# Patient Record
Sex: FEMALE | Race: OTHER
[De-identification: names, ages, dates, MRNs, and addresses within clinical notes are randomized per-mention and may not be internally consistent; named-entity substitution may affect disease eponyms.]

---

## 2018-06-25 ENCOUNTER — HOSPITAL ENCOUNTER (OUTPATIENT)
Dept: HOSPITAL 61 - US | Age: 38
Discharge: HOME | End: 2018-06-25
Attending: FAMILY MEDICINE
Payer: COMMERCIAL

## 2018-06-25 DIAGNOSIS — Z3A.21: ICD-10-CM

## 2018-06-25 DIAGNOSIS — Z34.82: Primary | ICD-10-CM

## 2018-06-25 PROCEDURE — 76805 OB US >/= 14 WKS SNGL FETUS: CPT

## 2018-09-24 ENCOUNTER — HOSPITAL ENCOUNTER (OUTPATIENT)
Dept: HOSPITAL 61 - 3 SO LND | Age: 38
Setting detail: OBSERVATION
Discharge: HOME | End: 2018-09-24
Attending: FAMILY MEDICINE | Admitting: FAMILY MEDICINE
Payer: COMMERCIAL

## 2018-09-24 ENCOUNTER — HOSPITAL ENCOUNTER (OUTPATIENT)
Dept: HOSPITAL 61 - US | Age: 38
Discharge: HOME | End: 2018-09-24
Attending: FAMILY MEDICINE
Payer: COMMERCIAL

## 2018-09-24 DIAGNOSIS — Z3A.38: ICD-10-CM

## 2018-09-24 DIAGNOSIS — O24.419: Primary | ICD-10-CM

## 2018-09-24 DIAGNOSIS — Z3A.33: ICD-10-CM

## 2018-09-24 PROCEDURE — G0379 DIRECT REFER HOSPITAL OBSERV: HCPCS

## 2018-09-24 PROCEDURE — 59025 FETAL NON-STRESS TEST: CPT

## 2018-09-24 PROCEDURE — G0378 HOSPITAL OBSERVATION PER HR: HCPCS

## 2018-09-24 PROCEDURE — 76819 FETAL BIOPHYS PROFIL W/O NST: CPT

## 2018-09-24 NOTE — RAD
Ultrasound biophysical profile, 9/24/2018:

 

HISTORY: Gestational diabetes

 

The limited exam of the gravid uterus demonstrates a single fetus in a 

cephalic orientation. The fetal heart rate is 139 bpm. The placenta lies 

anteriorly. A normal amount of amniotic fluid is present with the MARK 

calculated at 11.8. The fetal weight was estimated at 5 pounds and 0 

ounces +/- 12 ounces. The EDC based on today's fetal measurements is 

11/7/2018, and correlates well with the EDC of 11/4/2018 established on 

the 6/25/2018 ultrasound exam. The following biophysical profile scores 

were obtained:

 

Fetal breathing movements-2

 

Fetal motion-2

 

Fetal tone-2

 

Amniotic fluid volume-2

 

Total score-8 out of 8:

 

IMPRESSION: The ultrasound component of the biophysical profile score is 8

out of 8.

 

Electronically signed by: Rick Moritz, MD (9/24/2018 4:53 PM) [facility]

## 2018-10-08 ENCOUNTER — HOSPITAL ENCOUNTER (OUTPATIENT)
Dept: HOSPITAL 61 - US | Age: 38
Setting detail: OBSERVATION
Discharge: HOME | End: 2018-10-08
Attending: FAMILY MEDICINE | Admitting: FAMILY MEDICINE
Payer: COMMERCIAL

## 2018-10-08 DIAGNOSIS — O24.419: Primary | ICD-10-CM

## 2018-10-08 DIAGNOSIS — Z3A.35: ICD-10-CM

## 2018-10-08 PROCEDURE — 59025 FETAL NON-STRESS TEST: CPT

## 2018-10-08 PROCEDURE — G0379 DIRECT REFER HOSPITAL OBSERV: HCPCS

## 2018-10-08 PROCEDURE — G0378 HOSPITAL OBSERVATION PER HR: HCPCS

## 2018-10-08 PROCEDURE — 76819 FETAL BIOPHYS PROFIL W/O NST: CPT

## 2018-10-08 NOTE — RAD
Ultrasound biophysical profile, 10/8/2018:

 

HISTORY: Gestational diabetes

 

The limited exam of the gravid uterus demonstrates a single fetus in a 

cephalic orientation. The fetal heart rate is 121 bpm. The placenta lies 

anteriorly. The amniotic fluid index is 11.5 which is within normal 

limits. The fetal weight was estimated at 6 pounds and 5 ounces +/- 15.

 

The following biophysical profile scores were obtained:

 

Fetal breathing movements-2

 

Fetal motion-2

 

Fetal tone-2

 

Amniotic fluid volume-2

 

Total score-8 out of 8

 

 

IMPRESSION: The ultrasound component of the biophysical profile score is 8

out of 8.

 

Electronically signed by: Rick Moritz, MD (10/8/2018 5:04 PM) [facility]

## 2018-10-22 ENCOUNTER — HOSPITAL ENCOUNTER (OUTPATIENT)
Dept: HOSPITAL 61 - 3 SO LND | Age: 38
Setting detail: OBSERVATION
Discharge: HOME | End: 2018-10-22
Attending: FAMILY MEDICINE | Admitting: FAMILY MEDICINE
Payer: COMMERCIAL

## 2018-10-22 DIAGNOSIS — O24.419: Primary | ICD-10-CM

## 2018-10-22 DIAGNOSIS — Z3A.37: ICD-10-CM

## 2018-10-22 PROCEDURE — 76819 FETAL BIOPHYS PROFIL W/O NST: CPT

## 2018-10-22 PROCEDURE — G0379 DIRECT REFER HOSPITAL OBSERV: HCPCS

## 2018-10-22 PROCEDURE — G0378 HOSPITAL OBSERVATION PER HR: HCPCS

## 2018-10-22 NOTE — RAD
Ultrasound biophysical profile, 10/22/2018:

 

HISTORY: Gestational diabetes

 

The limited exam of the gravid uterus demonstrates a single fetus in a 

cephalic orientation. The fetal heart rate is 123 bpm. The fetal weight 

was estimated at 7 pounds and 9 ounces +/- 18 ounces. The placenta lies 

anteriorly. Amniotic fluid index was calculated at 11.0. The following 

biophysical profile scores were obtained:

 

Fetal breathing movements-2

 

Fetal motion-2

 

Fetal tone-2

 

Amniotic fluid volume-2

 

Total score-8 out of 8:

 

IMPRESSION: The ultrasound component of the biophysical profile score is 8

out of 8.

 

Electronically signed by: Rick Moritz, MD (10/22/2018 12:48 PM) [facility]

## 2018-10-29 ENCOUNTER — HOSPITAL ENCOUNTER (OUTPATIENT)
Dept: HOSPITAL 61 - 3 SO LND | Age: 38
Setting detail: OBSERVATION
Discharge: HOME | End: 2018-10-29
Attending: FAMILY MEDICINE | Admitting: FAMILY MEDICINE
Payer: COMMERCIAL

## 2018-10-29 DIAGNOSIS — Z3A.38: ICD-10-CM

## 2018-10-29 DIAGNOSIS — O24.410: Primary | ICD-10-CM

## 2018-10-29 PROCEDURE — G0378 HOSPITAL OBSERVATION PER HR: HCPCS

## 2018-10-29 PROCEDURE — G0379 DIRECT REFER HOSPITAL OBSERV: HCPCS

## 2018-10-29 PROCEDURE — 76819 FETAL BIOPHYS PROFIL W/O NST: CPT

## 2018-10-29 NOTE — RAD
Ultrasound biophysical profile

 

History:  Gestational diabetes.  Weekly biophysical profile.

 

Comparison:  Same examination October 22, 2018.

 

Findings:

 

Ultrasound biophysical profile was performed. Score is as follows:

 

Fetal motion, 2 of 2.

 

Fetal breathing, 2 of 2.

 

Fetal tone, 2 of 2.

 

Qualitative amniotic fluid volume, 2 of 2.

 

Total score is 8 of 8.

 

Fetal presentation is cephalic.  Placenta is anterior.  Fetal heart rate 

is 133 bpm.  Amniotic fluid index is 10.3 cm.

 

BPD is 9.41 cm corresponding to 38 weeks 2 days.  HC is 33.5 cm, 

corresponding to 38 weeks 6 days.  AC is 34.74 cm, corresponding to 39 

weeks 5 days.  FL is 7.75 cm, corresponding to 39 weeks 4 days.  HC/AC 

ratio is 0.95, within normal limits.  Average ultrasound age is 39 weeks 1

day.  Estimated date of delivery based on current measurements is November 4, 2018.  Estimated fetal weight is 3768 +/- 558 g which is at 84 

percentile.  Clinical gestational age is 38 weeks 0 days.

 

Impression:

Biophysical profile score is 8 of 8.

 

Electronically signed by: Kyle Martinez MD (10/29/2018 4:15 PM) [facility]

## 2018-11-05 ENCOUNTER — HOSPITAL ENCOUNTER (INPATIENT)
Dept: HOSPITAL 61 - 3 SO LND | Age: 38
LOS: 3 days | Discharge: HOME | End: 2018-11-08
Attending: FAMILY MEDICINE | Admitting: FAMILY MEDICINE
Payer: COMMERCIAL

## 2018-11-05 ENCOUNTER — HOSPITAL ENCOUNTER (OUTPATIENT)
Dept: HOSPITAL 61 - 3 SO LND | Age: 38
Setting detail: OBSERVATION
Discharge: HOME | End: 2018-11-05
Attending: FAMILY MEDICINE | Admitting: FAMILY MEDICINE
Payer: COMMERCIAL

## 2018-11-05 VITALS — BODY MASS INDEX: 42.26 KG/M2 | WEIGHT: 215.25 LBS | HEIGHT: 60 IN

## 2018-11-05 VITALS — DIASTOLIC BLOOD PRESSURE: 83 MMHG | SYSTOLIC BLOOD PRESSURE: 150 MMHG

## 2018-11-05 DIAGNOSIS — Z3A.39: ICD-10-CM

## 2018-11-05 DIAGNOSIS — D64.9: ICD-10-CM

## 2018-11-05 DIAGNOSIS — Z37.9: ICD-10-CM

## 2018-11-05 LAB
BASOPHILS # BLD AUTO: 0.1 X10^3/UL (ref 0–0.2)
BASOPHILS NFR BLD: 1 % (ref 0–3)
EOSINOPHIL NFR BLD: 0.1 X10^3/UL (ref 0–0.7)
EOSINOPHIL NFR BLD: 1 % (ref 0–3)
ERYTHROCYTE [DISTWIDTH] IN BLOOD BY AUTOMATED COUNT: 14.8 % (ref 11.5–14.5)
HCT VFR BLD CALC: 35.6 % (ref 36–47)
HGB BLD-MCNC: 12.4 G/DL (ref 12–15.5)
LYMPHOCYTES # BLD: 1.8 X10^3/UL (ref 1–4.8)
LYMPHOCYTES NFR BLD AUTO: 21 % (ref 24–48)
MCH RBC QN AUTO: 30 PG (ref 25–35)
MCHC RBC AUTO-ENTMCNC: 35 G/DL (ref 31–37)
MCV RBC AUTO: 85 FL (ref 79–100)
MONO #: 0.5 X10^3/UL (ref 0–1.1)
MONOCYTES NFR BLD: 6 % (ref 0–9)
NEUT #: 6.2 X10^3UL (ref 1.8–7.7)
NEUTROPHILS NFR BLD AUTO: 71 % (ref 31–73)
PLATELET # BLD AUTO: 211 X10^3/UL (ref 140–400)
RBC # BLD AUTO: 4.2 X10^6/UL (ref 3.5–5.4)
WBC # BLD AUTO: 8.7 X10^3/UL (ref 4–11)

## 2018-11-05 PROCEDURE — 86592 SYPHILIS TEST NON-TREP QUAL: CPT

## 2018-11-05 PROCEDURE — 85025 COMPLETE CBC W/AUTO DIFF WBC: CPT

## 2018-11-05 PROCEDURE — 3E0P7VZ INTRODUCTION OF HORMONE INTO FEMALE REPRODUCTIVE, VIA NATURAL OR ARTIFICIAL OPENING: ICD-10-PCS | Performed by: FAMILY MEDICINE

## 2018-11-05 PROCEDURE — 36415 COLL VENOUS BLD VENIPUNCTURE: CPT

## 2018-11-05 PROCEDURE — 86901 BLOOD TYPING SEROLOGIC RH(D): CPT

## 2018-11-05 PROCEDURE — G0379 DIRECT REFER HOSPITAL OBSERV: HCPCS

## 2018-11-05 PROCEDURE — 0DQR0ZZ REPAIR ANAL SPHINCTER, OPEN APPROACH: ICD-10-PCS | Performed by: FAMILY MEDICINE

## 2018-11-05 PROCEDURE — 86900 BLOOD TYPING SEROLOGIC ABO: CPT

## 2018-11-05 PROCEDURE — 90715 TDAP VACCINE 7 YRS/> IM: CPT

## 2018-11-05 PROCEDURE — 85027 COMPLETE CBC AUTOMATED: CPT

## 2018-11-05 PROCEDURE — 76819 FETAL BIOPHYS PROFIL W/O NST: CPT

## 2018-11-05 PROCEDURE — 86850 RBC ANTIBODY SCREEN: CPT

## 2018-11-05 PROCEDURE — 59025 FETAL NON-STRESS TEST: CPT

## 2018-11-05 PROCEDURE — G0378 HOSPITAL OBSERVATION PER HR: HCPCS

## 2018-11-05 NOTE — RAD
Ultrasound biophysical profile exam, 11/5/2018:

 

HISTORY: Gestational diabetes

 

The limited exam of the gravid uterus demonstrates a single fetus in a 

cephalic orientation. The fetal heart rate is 135 bpm. The fetal weight 

was estimated at 8 pounds and 8 ounces +/- 20 ounces. The placenta lies 

anteriorly. The following biophysical profile scores were obtained:

 

Fetal breathing movements-2

 

Fetal motion-2

 

Fetal tone-2

 

Amniotic fluid volume-2

 

Total score-8 out of 8

 

IMPRESSION: The ultrasound component of the biophysical profile score is 8

out of 8.

 

Electronically signed by: Rick Moritz, MD (11/5/2018 4:55 PM) [facility]

## 2018-11-06 VITALS — SYSTOLIC BLOOD PRESSURE: 122 MMHG | DIASTOLIC BLOOD PRESSURE: 68 MMHG

## 2018-11-06 VITALS — SYSTOLIC BLOOD PRESSURE: 128 MMHG | DIASTOLIC BLOOD PRESSURE: 83 MMHG

## 2018-11-06 RX ADMIN — IBUPROFEN PRN MG: 400 TABLET ORAL at 16:22

## 2018-11-06 NOTE — PDOC1
OB - History


Hx of Present Pregnancy


Prenatal Care:  Good Care


Ultrasounds:  Normal mid trimester US


Obstetrical Complications:  Gestational Diabetes


Medical Complications:  None





Past Family/Social History


*


Past Medical, Surgical, Family and Obstetric Histories reviewed from prenatal 

chart.


Blood Type:  B+


Rubella:  Immune


RPR/VDRL:  Negative


GBS Status:  Negative


HBsAG:  Negative





OB - Chief Complaint & HPI


Date of Admission:


Date of Admission:  2018 at 20:17


Chief Complaint/History


:  3


Para:  2


EDC:  2018


EGA:  39.1


Reason for admission:  induction of labor


Indication for induction:  medical complication


Admission Nurse Assessment Rev:  No





OB - Admission Exam


Physical Exam


Vitals:





 VS - Last 72 Hours, by Label








  Date Time  Temp Pulse Resp B/P (MAP) Pulse Ox O2 Delivery O2 Flow Rate FiO2


 


18 20:44 98.1 62 18 150/83 (105)  Room Air  





 98.1       








HEENT:  Normal, Nasal Mucosa Normal, Oropharynx Normal, Moist Membranes, 

Fontanelles Normal


Heart:  Regular Rate, No Murmurs, No Gallops, No Rubs


Lungs:  Clear, Equal


Abdomen:  Gravid


Extremities:  Normal Pulses, No tenderness or swelling


Reflexes:  Normal


Cervical Dilatation:  None


Effacement:  50%


Station:  -3


Membranes:  Intact


Accelerations:  Accelerations Present


Decelerations:  No decelerations


Short Term Variability:  Present


Long Term Variability:  Moderate


Contractions on Admission:  None


Date/Time Contractions Began;:  18


Frequency of Contractions:  0500


A/P


Pt is a 37yo  admitted for IOL 2/2 GDM





1)IOL- s/p Cervadil last night.  Pt went into labor early this morning.  CEFM


2)Pain mgmt- pt not interested in pain medication at this time


3)GBS negative


4)Pt is planning on breastfeeding


5)GDM- BS diet controlled











MALGORZATA IZAGUIRRE MD 2018 07:02

## 2018-11-06 NOTE — PDOC
VAGINAL DELIVERY


DATE


DATE: 18


TIME


0717


:  3


Para:  3


EDC:  2018


EGA:  39.1


VAGINAL DELIVERY:  VTX


VACCUM ASSISTED:  No


PLACENTA:  Spontaneous


APGAR


8 and 9


SEX:  Male


WEIGHT


Weight 3505g or 7 pound 12oz


Nuchal Cord:  Yes (body cord), Times 1, Tight


Amniotic Fluid:  Clear


PAIN:  Natural


EPISIOTOMY:  No


EXTENSION:  Yes (3rd degree perianal)


REPAIRED WITH


3'0" vicryl


EBL


350cc


COMPLICATIONS


None


CONDITION


Stable


PEDIATRICIAN


Dr. Izaguirre


Signs of Intrauterine Infectio:  None


Shoulder Dystocia:  No


DIAGNOSIS


Pt is a 37yo G3 now P3 s/p induced vaginal delivery at 39.1wga 2/2 GDM





1)- with 3rd degree perianal repair


2)Pain mgmt- will have ibuprofen available for pain


3)GBS negative


4)Pt is planning on breastfeeding


5)GDM- BS diet controlled











MALGORZATA IZAGUIRRE MD 2018 07:42

## 2018-11-07 VITALS — DIASTOLIC BLOOD PRESSURE: 70 MMHG | SYSTOLIC BLOOD PRESSURE: 122 MMHG

## 2018-11-07 VITALS — SYSTOLIC BLOOD PRESSURE: 107 MMHG | DIASTOLIC BLOOD PRESSURE: 57 MMHG

## 2018-11-07 VITALS — SYSTOLIC BLOOD PRESSURE: 126 MMHG | DIASTOLIC BLOOD PRESSURE: 66 MMHG

## 2018-11-07 VITALS — SYSTOLIC BLOOD PRESSURE: 123 MMHG | DIASTOLIC BLOOD PRESSURE: 72 MMHG

## 2018-11-07 VITALS — DIASTOLIC BLOOD PRESSURE: 68 MMHG | SYSTOLIC BLOOD PRESSURE: 126 MMHG

## 2018-11-07 VITALS — DIASTOLIC BLOOD PRESSURE: 53 MMHG | SYSTOLIC BLOOD PRESSURE: 111 MMHG

## 2018-11-07 LAB
ERYTHROCYTE [DISTWIDTH] IN BLOOD BY AUTOMATED COUNT: 15.2 % (ref 11.5–14.5)
HCT VFR BLD CALC: 31.9 % (ref 36–47)
HGB BLD-MCNC: 10.9 G/DL (ref 12–15.5)
MCH RBC QN AUTO: 29 PG (ref 25–35)
MCHC RBC AUTO-ENTMCNC: 34 G/DL (ref 31–37)
MCV RBC AUTO: 85 FL (ref 79–100)
PLATELET # BLD AUTO: 178 X10^3/UL (ref 140–400)
RBC # BLD AUTO: 3.76 X10^6/UL (ref 3.5–5.4)
WBC # BLD AUTO: 10.4 X10^3/UL (ref 4–11)

## 2018-11-07 NOTE — PDOC
OB Progress Note


Date of Service


18


Time of Evaluation


0800


Date:


18


Time:


07


Notes


Mom doing well.  Breastfeeding is going well.  Pain is well controlled.  No BM 

yet.  Lochia is about the same as a period


OB VITAL SIGNS:  Temperature (97.9), Blood Pressure (123/72), Pulse (65), O2 

Sat (97% RA)


Lab





Laboratory Tests








Test


 18


20:55 18


04:15


 


White Blood Count


 8.7 x10^3/uL


(4.0-11.0) 10.4 x10^3/uL


(4.0-11.0)


 


Red Blood Count


 4.20 x10^6/uL


(3.50-5.40) 3.76 x10^6/uL


(3.50-5.40)


 


Hemoglobin


 12.4 g/dL


(12.0-15.5) 10.9 g/dL


(12.0-15.5)


 


Hematocrit


 35.6 %


(36.0-47.0) 31.9 %


(36.0-47.0)


 


Mean Corpuscular Volume 85 fL ()  85 fL () 


 


Mean Corpuscular Hemoglobin 30 pg (25-35)  29 pg (25-35) 


 


Mean Corpuscular Hemoglobin


Concent 35 g/dL


(31-37) 34 g/dL


(31-37)


 


Red Cell Distribution Width


 14.8 %


(11.5-14.5) 15.2 %


(11.5-14.5)


 


Platelet Count


 211 x10^3/uL


(140-400) 178 x10^3/uL


(140-400)


 


Neutrophils (%) (Auto) 71 % (31-73)  


 


Lymphocytes (%) (Auto) 21 % (24-48)  


 


Monocytes (%) (Auto) 6 % (0-9)  


 


Eosinophils (%) (Auto) 1 % (0-3)  


 


Basophils (%) (Auto) 1 % (0-3)  


 


Neutrophils # (Auto)


 6.2 x10^3uL


(1.8-7.7) 





 


Lymphocytes # (Auto)


 1.8 x10^3/uL


(1.0-4.8) 





 


Monocytes # (Auto)


 0.5 x10^3/uL


(0.0-1.1) 





 


Eosinophils # (Auto)


 0.1 x10^3/uL


(0.0-0.7) 





 


Basophils # (Auto)


 0.1 x10^3/uL


(0.0-0.2) 





 


Treponema pallidum Antibody


 Nonreactive


(Nonreactive) 











Laboratory Tests








Test


 18


04:15


 


White Blood Count


 10.4 x10^3/uL


(4.0-11.0)


 


Red Blood Count


 3.76 x10^6/uL


(3.50-5.40)


 


Hemoglobin


 10.9 g/dL


(12.0-15.5)


 


Hematocrit


 31.9 %


(36.0-47.0)


 


Mean Corpuscular Volume 85 fL () 


 


Mean Corpuscular Hemoglobin 29 pg (25-35) 


 


Mean Corpuscular Hemoglobin


Concent 34 g/dL


(31-37)


 


Red Cell Distribution Width


 15.2 %


(11.5-14.5)


 


Platelet Count


 178 x10^3/uL


(140-400)








Medications





Current Medications


Sodium Chloride (Normal Saline Flush) 3 ml QSHIFT  PRN IV AFTER MEDS AND BLOOD 

DRAWS;  Start 18 at 20:30;  Stop 18 at 11:08;  Status DC


Ringer's Solution 1,000 ml @  125 mls/hr Q8H IV  Last administered on 18at 

21:02;  Start 18 at 20:18;  Stop 18 at 11:08;  Status DC


Butorphanol Tartrate (Stadol) 2 mg PRN Q1HR  PRN IV Severe labor pain;  Start  at 20:30;  Stop 18 at 11:08;  Status DC


Fentanyl Citrate (Fentanyl 2ml Vial) 50 mcg PRN Q30MIN  PRN IV Mild to moderate 

pain;  Start 18 at 20:30;  Stop 18 at 11:08;  Status DC


Fentanyl Citrate (Fentanyl 2ml Vial) 100 mcg PRN Q30MIN  PRN IV Severe pain;  

Start 18 at 20:30;  Stop 18 at 11:08;  Status DC


Acetaminophen (Tylenol) 650 mg PRN Q6HRS  PRN PO MILD PAIN / TEMP;  Start  at 20:30


Ondansetron HCl (Zofran) 4 mg PRN Q4HRS  PRN IV NAUSEA/VOMITING;  Start 18 

at 20:30;  Stop 18 at 11:08;  Status DC


Citric Acid/ Sodium Citrate (Bicitra) 30 ml 1X PRN  PRN PO DYSPEPSIA;  Start 18 at 20:30;  Stop 18 at 11:08;  Status DC


Terbutaline Sulfate (Brethine) 0.25 mg 1X PRN  PRN SQ SEE COMMENTS;  Start  at 20:30;  Stop 18 at 11:08;  Status DC


Lidocaine HCl (Xylocaine 1% Pf 30ml Vial) 30 ml 1X PRN  PRN INJ SEE COMMENTS 

Last administered on 18at 07:55;  Start 18 at 20:30;  Stop 18 at 

11:08;  Status DC


Oxytocin/Sodium Chloride 500 ml @ 0 mls/hr CONT  PRN IV SEE I/O RECORD;  Start 

18 at 08:00;  Stop 18 at 11:08;  Status DC


Oxytocin/Sodium Chloride 500 ml @ 0 mls/hr CONT PRN  PRN IV Post delivery 

bleeding Last administered on 18at 07:54;  Start 18 at 20:30;  Stop  at 11:08;  Status DC


Ibuprofen (Motrin) 800 mg PRN Q6HRS  PRN PO MODERATE POST DELIVERY PAIN Last 

administered on 18at 16:22;  Start 18 at 20:30


Dinoprostone (Cervidil) 10 mg 1X  ONCE VG  Last administered on 18at 21:29

;  Start 18 at 21:00;  Stop 18 at 11:08;  Status DC


Sodium Chloride (Normal Saline Flush) 10 ml QSHIFT  PRN IV AFTER MEDS AND BLOOD 

DRAWS;  Start 18 at 07:45;  Stop 18 at 11:08;  Status DC


Oxytocin/Sodium Chloride 500 ml @  62.5 mls/hr CONT  PRN IV SEE I/O RECORD;  

Start 18 at 07:45;  Stop 18 at 11:08;  Status DC


Acetaminophen (Tylenol) 650 mg PRN Q6HRS  PRN PO MILD PAIN / TEMP;  Start  at 07:45;  Stop 18 at 11:08;  Status DC


Ibuprofen (Motrin) 800 mg PRN Q8HRS  PRN PO INFLAMMATION/PAIN PREVENTION Last 

administered on 18at 09:51;  Start 18 at 07:45;  Stop 18 at 11:08

;  Status DC


Docusate Sodium (Colace) 100 mg PRN BID  PRN PO CONSTIPATION;  Start 18 at 

07:45


Magnesium Hydroxide (Milk Of Magnesia) 2,400 mg PRN DAILY  PRN PO CONSTIPATION;

  Start 18 at 07:45


Al Hydroxide/Mg Hydroxide (Mylanta Plus Xs) 30 ml PRN Q4HRS  PRN PO HEARTBURN / 

GAS;  Start 18 at 07:45


Simethicone (Gas-X) 80 mg PRN AFTMEALHC  PRN PO GAS / BLOATING;  Start 18 

at 07:45


Diphenhydramine HCl (Benadryl) 25 mg PRN Q6HRS  PRN PO ITCHING;  Start 18 

at 07:45


Benzocaine (Americaine) 1 spray PRN QID  PRN TP TOPICAL PAIN Last administered 

on 18at 09:50;  Start 18 at 07:45


Phenyleph/Shark Oil/Min Oil/Petrol (Preparation H) 1 karuna PRN QID  PRN RC RECTAL 

PAIN;  Start 18 at 07:45


Hydrocortisone (Cortaid) 1 karuna PRN QID  PRN TP PERINEAL PAIN;  Start 18 at 

07:45


Ferrous Sulfate (Feosol) 325 mg BIDWMEALS PO ;  Start 18 at 08:00


Zolpidem Tartrate (Ambien) 5 mg PRN QHS  PRN PO INSOMNIA, MAY REPEAT X1;  Start 

18 at 07:45


Info (Do NOT chart on this placeholder) 1 ea 1X PRN  PRN MC SEE COMMENTS;  

Start 18 at 07:45


Info (Do NOT chart on this placeholder) 1 ea 1X PRN  PRN MC SEE COMMENTS;  

Start 18 at 07:45;  Stop 18 at 11:08;  Status DC


Diphtheria/ Tetanus/Acell Pertussis (Boostrix) 0.5 ml ONCE ONCE VAX IM ;  Start 

18 at 17:15;  Stop 18 at 17:16;  Status DC


Exam


GEN: NAD, AOx3


HEENT: MMM, EOMI, no scleral icterus/injection


Cardiac: RRR, no M/R/G


Lungs: CTAB, regular breathing rate and effort


Abd: fundal height approximately umbilicus


Ext: no erythema/edema LE bilaterally


Assessment


Pt is a 39yo G3 now P3 s/p induced vaginal delivery at 39.1wga 2/2 GDM





1)- with 3rd degree perianal repair


2)Pain mgmt- continue Ibuprofen


3)GBS negative


4)Pt is breastfeeding


5)GDM- BS diet controlled


6)Anemia- continue Ferrous Sulfate











MALGORZATA IZAGUIRRE MD 2018 08:40

## 2018-11-08 VITALS — DIASTOLIC BLOOD PRESSURE: 81 MMHG | SYSTOLIC BLOOD PRESSURE: 126 MMHG

## 2018-11-08 VITALS — SYSTOLIC BLOOD PRESSURE: 121 MMHG | DIASTOLIC BLOOD PRESSURE: 67 MMHG

## 2018-11-08 RX ADMIN — IBUPROFEN PRN MG: 400 TABLET ORAL at 08:21

## 2018-11-08 NOTE — PDOC3
OB DISCHARGE SUMMARY


DATE OF ADMISSION:  


18


DATE OF DISCHARGE:  


18


REASON FOR ADMISSION:   Induction of labor


PRENATAL PROCEDURES:  Mgmt of OB Complications


INTRAPARTUM PROCEDURES:  Spontanous Vag Deliv, Perineal Laceration


DISCHARGE DIAGNOSIS:  Term Pregnancy Delivered


DISCHARGE INFORMATION:  Activity (As tolerated), Diet (Regular), Medications (

Ferrous Sulfate 325mg qday, Ibuprofen 800mg TID prn pain), Instructions (Please 

follow up with Dr. Izaguirre in 4-6 weeks), Discharge to (Home)


HOSPITAL COURSE


Pt is a 37yo G3 now P3 s/p induced vaginal delivery at 39.1wga 2/2 GDM





1)- with 3rd degree perianal repair


2)Pain mgmt- continue Ibuprofen


3)GBS negative


4)Pt is breastfeeding


5)GDM- BS diet controlled


6)Anemia- continue Ferrous Sulfate











MALGORZATA IZAGUIRRE MD 2018 08:44